# Patient Record
Sex: FEMALE | Race: WHITE | NOT HISPANIC OR LATINO | Employment: FULL TIME | URBAN - METROPOLITAN AREA
[De-identification: names, ages, dates, MRNs, and addresses within clinical notes are randomized per-mention and may not be internally consistent; named-entity substitution may affect disease eponyms.]

---

## 2017-11-29 ENCOUNTER — HOSPITAL ENCOUNTER (EMERGENCY)
Facility: CLINIC | Age: 37
Discharge: HOME | End: 2017-11-29
Attending: FAMILY MEDICINE | Admitting: FAMILY MEDICINE
Payer: COMMERCIAL

## 2017-11-29 VITALS
HEIGHT: 66 IN | HEART RATE: 70 BPM | BODY MASS INDEX: 41.55 KG/M2 | RESPIRATION RATE: 16 BRPM | WEIGHT: 258.56 LBS | TEMPERATURE: 97 F | DIASTOLIC BLOOD PRESSURE: 80 MMHG | SYSTOLIC BLOOD PRESSURE: 132 MMHG | OXYGEN SATURATION: 99 %

## 2017-11-29 DIAGNOSIS — G43.911 INTRACTABLE MIGRAINE WITH STATUS MIGRAINOSUS, UNSPECIFIED MIGRAINE TYPE: ICD-10-CM

## 2017-11-29 PROCEDURE — 96375 TX/PRO/DX INJ NEW DRUG ADDON: CPT | Performed by: FAMILY MEDICINE

## 2017-11-29 PROCEDURE — 96365 THER/PROPH/DIAG IV INF INIT: CPT | Performed by: FAMILY MEDICINE

## 2017-11-29 PROCEDURE — 96361 HYDRATE IV INFUSION ADD-ON: CPT | Performed by: FAMILY MEDICINE

## 2017-11-29 PROCEDURE — 99284 EMERGENCY DEPT VISIT MOD MDM: CPT | Mod: Z6 | Performed by: FAMILY MEDICINE

## 2017-11-29 PROCEDURE — 25000128 H RX IP 250 OP 636: Performed by: FAMILY MEDICINE

## 2017-11-29 PROCEDURE — 99284 EMERGENCY DEPT VISIT MOD MDM: CPT | Mod: 25 | Performed by: FAMILY MEDICINE

## 2017-11-29 RX ORDER — HALOPERIDOL 5 MG/ML
5 INJECTION INTRAMUSCULAR ONCE
Status: COMPLETED | OUTPATIENT
Start: 2017-11-29 | End: 2017-11-29

## 2017-11-29 RX ORDER — MAGNESIUM SULFATE 1 G/100ML
1 INJECTION INTRAVENOUS ONCE
Status: COMPLETED | OUTPATIENT
Start: 2017-11-29 | End: 2017-11-29

## 2017-11-29 RX ORDER — DIPHENHYDRAMINE HYDROCHLORIDE 50 MG/ML
25 INJECTION INTRAMUSCULAR; INTRAVENOUS ONCE
Status: COMPLETED | OUTPATIENT
Start: 2017-11-29 | End: 2017-11-29

## 2017-11-29 RX ORDER — DEXAMETHASONE SODIUM PHOSPHATE 10 MG/ML
10 INJECTION, SOLUTION INTRAMUSCULAR; INTRAVENOUS ONCE
Status: COMPLETED | OUTPATIENT
Start: 2017-11-29 | End: 2017-11-29

## 2017-11-29 RX ORDER — KETOROLAC TROMETHAMINE 30 MG/ML
30 INJECTION, SOLUTION INTRAMUSCULAR; INTRAVENOUS ONCE
Status: COMPLETED | OUTPATIENT
Start: 2017-11-29 | End: 2017-11-29

## 2017-11-29 RX ORDER — METOCLOPRAMIDE HYDROCHLORIDE 5 MG/ML
10 INJECTION INTRAMUSCULAR; INTRAVENOUS ONCE
Status: COMPLETED | OUTPATIENT
Start: 2017-11-29 | End: 2017-11-29

## 2017-11-29 RX ORDER — SODIUM CHLORIDE 9 MG/ML
1000 INJECTION, SOLUTION INTRAVENOUS CONTINUOUS
Status: DISCONTINUED | OUTPATIENT
Start: 2017-11-29 | End: 2017-11-29 | Stop reason: HOSPADM

## 2017-11-29 RX ADMIN — KETOROLAC TROMETHAMINE 30 MG: 30 INJECTION, SOLUTION INTRAMUSCULAR at 13:10

## 2017-11-29 RX ADMIN — DEXAMETHASONE SODIUM PHOSPHATE 10 MG: 10 INJECTION, SOLUTION INTRAMUSCULAR; INTRAVENOUS at 14:05

## 2017-11-29 RX ADMIN — HALOPERIDOL LACTATE 5 MG: 5 INJECTION, SOLUTION INTRAMUSCULAR at 14:07

## 2017-11-29 RX ADMIN — METOCLOPRAMIDE 10 MG: 5 INJECTION, SOLUTION INTRAMUSCULAR; INTRAVENOUS at 13:11

## 2017-11-29 RX ADMIN — SODIUM CHLORIDE 1000 ML: 9 INJECTION, SOLUTION INTRAVENOUS at 13:08

## 2017-11-29 RX ADMIN — MAGNESIUM SULFATE IN DEXTROSE 1 G: 10 INJECTION, SOLUTION INTRAVENOUS at 13:08

## 2017-11-29 RX ADMIN — DIPHENHYDRAMINE HYDROCHLORIDE 25 MG: 50 INJECTION, SOLUTION INTRAMUSCULAR; INTRAVENOUS at 13:11

## 2017-11-29 NOTE — LETTER
[facility]  Emergency Department  [address], [place] [address] [phone] fax      11/29/2017      Cheyenne Salas  [address] [address] [facility] [address]  [phone] (home)         TO WHOM IT MAY CONCERN:      Cheyenne was seen in the Emergency Department on 11/29/2017.    Please excuse from work due to severe migraine headache yesterday and today.    She may return, without restrictions, when improved.      Sincerely,        Kain Carter MD  Emergency Physician

## 2017-11-29 NOTE — ED AVS SNAPSHOT
[facility] Emergency Department    [address] [place] [address]    Phone:  [phone]    Fax:  [phone]                                       Cheyenne Salas   MRN: [id number]    Department:  [facility] Emergency Department   Date of Visit:  11/29/2017           After Visit Summary Signature Page     I have received my discharge instructions, and my questions have been answered. I have discussed any challenges I see with this plan with the nurse or doctor.    ..........................................................................................................................................  Patient/Patient Representative Signature      ..........................................................................................................................................  Patient Representative Print Name and Relationship to Patient    ..................................................               ................................................  Date                                            Time    ..........................................................................................................................................  Reviewed by Signature/Title    ...................................................              ..............................................  Date                                                            Time

## 2017-11-29 NOTE — ED AVS SNAPSHOT
[facility] Emergency Department    [address] [place] [address]    Phone:  [phone]    Fax:  [phone]                                       Cheyenne Salas   MRN: [id number]    Department:  [facility] Emergency Department   Date of Visit:  11/29/2017           Patient Information     Date Of Birth          1980        Your diagnoses for this visit were:     Intractable migraine with status migrainosus, unspecified migraine type        You were seen by Kain Campo MD.      Follow-up Information     Follow up with Your primary physician.    Why:  As needed        Discharge Instructions       Go home and rest.  Recheck in clinic if persistent problems.  Return to the ED if worse/concerns.  It was a pleasure visiting with you this afternoon.  I'm glad you are feeling better and hope you continue to improve.    Thank you for choosing [facility]. We appreciate the opportunity to meet your urgent medical needs. Please let us know if we could have done anything to make your stay more satisfying.    After discharge, please closely monitor for any new or worsening symptoms. Return to the Emergency Department if you develop any acute worsening signs or symptoms.    If you received an opiate pain medication or sedative during your visit, please do not drive for at least 8 hours.     If you had lab work, cultures or imaging studies done during your stay, the final results may still be pending. We will call you if your plan of care needs to change. However, if you are not improving as expected, please follow up with your primary care provider or clinic.     Start any prescription medications that were prescribed to you and take them as directed.     Please see additional handouts that may be pertinent to your condition.            24 Hour Appointment Hotline       To make an appointment at any [facility], call [phone] ([phone]). If you  don't have a family doctor or clinic, we will help you find one. [facility] clinics are conveniently located to serve the needs of you and your family.             Review of your medicines      Our records show that you are taking the medicines listed below. If these are incorrect, please call your family doctor or clinic.        Dose / Directions Last dose taken    ALLEGRA PO        Refills:  0        CRYSELLE-28 0.3-30 MG-MCG per tablet   Generic drug:  norgestrel-ethinyl estradiol        1 TABLET DAILY   Refills:  0        FLONASE 50 MCG/ACT spray   Generic drug:  fluticasone        1 squirt each nostril once a day   Refills:  0        MAXALT PO        Refills:  0        NAPROSYN PO        Refills:  0        OXYCODONE HCL PO        Refills:  0        PRILOSEC OTC PO        Refills:  0        PROZAC PO   Dose:  40 mg        Take 40 mg by mouth   Refills:  0        Q-10 CO-ENZYME PO        1 QD PO   Refills:  0                Orders Needing Specimen Collection     None      Pending Results     No orders found from 11/27/2017 to 11/30/2017.            Pending Culture Results     No orders found from 11/27/2017 to 11/30/2017.            Pending Results Instructions     If you had any lab results that were not finalized at the time of your Discharge, you can call the ED Lab Result RN at [phone]. You will be contacted by this team for any positive Lab results or changes in treatment. The nurses are available 7 days a week from 10A to 6:30P.  You can leave a message 24 hours per day and they will return your call.        Thank you for choosing [facility]       Thank you for choosing [facility] for your care. Our goal is always to provide you with excellent care. Hearing back from our patients is one way we can continue to improve our services. Please take a few minutes to complete the written survey that you may receive in the mail after you visit with us. Thank you!        [facility] Information     Friday lets you send  "messages to your doctor, view your test results, renew your prescriptions, schedule appointments and more. To sign up, go to [email or web address] . Click on \"Log in\" on the left side of the screen, which will take you to the Welcome page. Then click on \"Sign up Now\" on the right side of the page.     You will be asked to enter the access code listed below, as well as some personal information. Please follow the directions to create your username and password.     Your access code is: [email or web address]  Expires: 2018  2:38 PM     Your access code will  in 90 days. If you need help or a new code, please call your [facility] clinic or [phone].        Care EveryWhere ID     This is your Care EveryWhere ID. This could be used by other organizations to access your [facility] medical records  [id number]        Equal Access to Services     [facility] : Hadii michael Almeida, wapiter ball, qaybkevan kaalmada efrain, luís head . So [facility] [phone].    ATENCIÓN: Si habla español, tiene a koehler disposición servicios gratuitos de asistencia lingüística. Llame al [phone].    We comply with applicable federal civil rights laws and [place] laws. We do not discriminate on the basis of race, color, national origin, age, disability, sex, sexual orientation, or gender identity.            After Visit Summary       This is your record. Keep this with you and show to your community pharmacist(s) and doctor(s) at your next visit.                  "

## 2017-11-29 NOTE — ED NOTES
Pt presents with severe left sided migraine. Pt states typical however medications she has taken has not helped. Pt states she hasn't had to go to the ER for headache for ten years. Pt did take maxalt and naprosyn and oxycodone from shoulder surgery. NOTE /116. Pt has been on BP meds in the past however not currently.

## 2017-11-29 NOTE — DISCHARGE INSTRUCTIONS
Go home and rest.  Recheck in clinic if persistent problems.  Return to the ED if worse/concerns.  It was a pleasure visiting with you this afternoon.  I'm glad you are feeling better and hope you continue to improve.    Thank you for choosing [facility]. We appreciate the opportunity to meet your urgent medical needs. Please let us know if we could have done anything to make your stay more satisfying.    After discharge, please closely monitor for any new or worsening symptoms. Return to the Emergency Department if you develop any acute worsening signs or symptoms.    If you received an opiate pain medication or sedative during your visit, please do not drive for at least 8 hours.     If you had lab work, cultures or imaging studies done during your stay, the final results may still be pending. We will call you if your plan of care needs to change. However, if you are not improving as expected, please follow up with your primary care provider or clinic.     Start any prescription medications that were prescribed to you and take them as directed.     Please see additional handouts that may be pertinent to your condition.

## 2017-11-29 NOTE — ED PROVIDER NOTES
History     Chief Complaint   Patient presents with     Headache     Two days ago. Nausea.      HPI  Cheyenne Salas is a 37 year old female who presents to the ED today with a left-sided headache.  She has a history of migraines and is actually been able to control them fairly well and hasn't required an ED visit for over 10 years.  To be uses Maxalt and sometimes oxycodone which has been very effective in aborting her headaches.  This headache unfortunately was present when she woke up yesterday morning so she was behind.  He tried her medications anyway and maybe felt slightly better by the evening but then slept poorly and it worsened again today.  This is a typical headache for her other than he wasn't able to control it because it was present upon waking.  No focal weakness or numbness.  No fevers, chills, sweats.  She does have nausea but no vomiting.  Is sensitive to light.      Problem List:    There are no active problems to display for this patient.       Past Medical History:    Past Medical History:   Diagnosis Date     Anxiety      Migraines      Unspecified otitis media        Past Surgical History:    Past Surgical History:   Procedure Laterality Date     HC CREATE EARDRUM OPENING,GEN ANESTH  1987    P.E. Tubes       Family History:    No family history on file.    Social History:  Marital Status:   [2]  Social History   Substance Use Topics     Smoking status: Never Smoker     Smokeless tobacco: Never Used      Comment: Boy Friend Smokes     Alcohol use No        Medications:      Fexofenadine HCl (ALLEGRA PO)   Omeprazole Magnesium (PRILOSEC OTC PO)   Rizatriptan Benzoate (MAXALT PO)   FLUoxetine HCl (PROZAC PO)   Naproxen (NAPROSYN PO)   OXYCODONE HCL PO   Q-10 CO-ENZYME OR   FLONASE 50 MCG/ACT NA SUSP   CRYSELLE-28 0.3-30 MG-MCG OR TABS         Review of Systems   All other systems reviewed and are negative.      Physical Exam   BP: (!) 202/116  Pulse: 102  Temp: 97  F (36.1  C)  Resp:  "16  Height: 167.6 cm (5' 6\")  Weight: 117.3 kg (258 lb 9 oz)  SpO2: 99 %      Physical Exam   Constitutional: She is oriented to person, place, and time. She appears well-developed and well-nourished. No distress.   HENT:   Mouth/Throat: Oropharynx is clear and moist.   Eyes: EOM are normal. Pupils are equal, round, and reactive to light.   Neck: Neck supple.   Tender in the left occiput and down the paraspinous musculature   Cardiovascular: Normal rate and regular rhythm.    Pulmonary/Chest: Effort normal and breath sounds normal.   Abdominal: Soft. There is no tenderness.   Musculoskeletal: Normal range of motion.   Neurological: She is alert and oriented to person, place, and time. She has normal strength. No cranial nerve deficit or sensory deficit. Coordination normal. GCS eye subscore is 4. GCS verbal subscore is 5. GCS motor subscore is 6.   Skin: Skin is warm and dry. No rash noted.   Psychiatric: She has a normal mood and affect.       ED Course      1 L NS  Magnesium Sulfate 1 gm IV  Benadry 25mg IV  Reglan 10mg IV  Toradol 30mg IV    Very little relief.    Decadron 10 mg IV and Haldol 5 mg IV given for the headache.     She feels much better after the Decadron and Haldol area and states her headache is down to 4/10 and kidney disease decreased.  She thinks she can make it at home and plans on going to sleep.          ED Course     Procedures           Assessments & Plan (with Medical Decision Making)    (G43.911) Intractable migraine with status migrainosus, unspecified migraine type  Comment:   Plan: She will follow up in clinic with her primary physician.  Verbal and written discharge instructions given.            I have reviewed the nursing notes.    I have reviewed the findings, diagnosis, plan and need for follow up with the patient.       New Prescriptions    No medications on file       Final diagnoses:   Intractable migraine with status migrainosus, unspecified migraine type       11/29/2017 "   [facility] EMERGENCY DEPARTMENT     Kain Campo MD  11/29/17 8830

## 2022-01-12 ENCOUNTER — LAB REQUISITION (OUTPATIENT)
Dept: LAB | Facility: CLINIC | Age: 42
End: 2022-01-12

## 2022-01-12 LAB — HBV SURFACE AB SERPL IA-ACNC: 25.69 M[IU]/ML

## 2022-01-12 PROCEDURE — 86481 TB AG RESPONSE T-CELL SUSP: CPT | Performed by: INTERNAL MEDICINE

## 2022-01-12 PROCEDURE — 86706 HEP B SURFACE ANTIBODY: CPT | Performed by: INTERNAL MEDICINE

## 2022-01-13 LAB
QUANTIFERON MITOGEN: 10 IU/ML
QUANTIFERON NIL TUBE: 0.01 IU/ML
QUANTIFERON TB1 TUBE: 0.01 IU/ML
QUANTIFERON TB2 TUBE: 0.02

## 2022-01-14 LAB
GAMMA INTERFERON BACKGROUND BLD IA-ACNC: 0.01 IU/ML
M TB IFN-G BLD-IMP: NEGATIVE
M TB IFN-G CD4+ BCKGRND COR BLD-ACNC: 9.99 IU/ML
MITOGEN IGNF BCKGRD COR BLD-ACNC: 0 IU/ML
MITOGEN IGNF BCKGRD COR BLD-ACNC: 0.01 IU/ML

## 2022-03-03 ENCOUNTER — HOSPITAL ENCOUNTER (EMERGENCY)
Facility: CLINIC | Age: 42
Discharge: HOME | End: 2022-03-03
Attending: EMERGENCY MEDICINE | Admitting: EMERGENCY MEDICINE
Payer: COMMERCIAL

## 2022-03-03 VITALS
HEART RATE: 78 BPM | RESPIRATION RATE: 16 BRPM | SYSTOLIC BLOOD PRESSURE: 136 MMHG | OXYGEN SATURATION: 98 % | DIASTOLIC BLOOD PRESSURE: 70 MMHG | TEMPERATURE: 97 F

## 2022-03-03 DIAGNOSIS — G43.809 OTHER MIGRAINE WITHOUT STATUS MIGRAINOSUS, NOT INTRACTABLE: ICD-10-CM

## 2022-03-03 PROCEDURE — 99284 EMERGENCY DEPT VISIT MOD MDM: CPT | Mod: 25 | Performed by: EMERGENCY MEDICINE

## 2022-03-03 PROCEDURE — 96361 HYDRATE IV INFUSION ADD-ON: CPT | Performed by: EMERGENCY MEDICINE

## 2022-03-03 PROCEDURE — 258N000003 HC RX IP 258 OP 636: Performed by: EMERGENCY MEDICINE

## 2022-03-03 PROCEDURE — 250N000011 HC RX IP 250 OP 636: Performed by: EMERGENCY MEDICINE

## 2022-03-03 PROCEDURE — 99284 EMERGENCY DEPT VISIT MOD MDM: CPT | Performed by: EMERGENCY MEDICINE

## 2022-03-03 PROCEDURE — 96375 TX/PRO/DX INJ NEW DRUG ADDON: CPT | Performed by: EMERGENCY MEDICINE

## 2022-03-03 PROCEDURE — 96365 THER/PROPH/DIAG IV INF INIT: CPT | Performed by: EMERGENCY MEDICINE

## 2022-03-03 RX ORDER — DIPHENHYDRAMINE HYDROCHLORIDE 50 MG/ML
50 INJECTION INTRAMUSCULAR; INTRAVENOUS ONCE
Status: COMPLETED | OUTPATIENT
Start: 2022-03-03 | End: 2022-03-03

## 2022-03-03 RX ORDER — KETOROLAC TROMETHAMINE 30 MG/ML
30 INJECTION, SOLUTION INTRAMUSCULAR; INTRAVENOUS ONCE
Status: COMPLETED | OUTPATIENT
Start: 2022-03-03 | End: 2022-03-03

## 2022-03-03 RX ORDER — ONDANSETRON 2 MG/ML
4 INJECTION INTRAMUSCULAR; INTRAVENOUS ONCE
Status: COMPLETED | OUTPATIENT
Start: 2022-03-03 | End: 2022-03-03

## 2022-03-03 RX ORDER — MAGNESIUM CARB/ALUMINUM HYDROX 105-160MG
296 TABLET,CHEWABLE ORAL ONCE
Status: DISCONTINUED | OUTPATIENT
Start: 2022-03-03 | End: 2022-03-03

## 2022-03-03 RX ORDER — MAGNESIUM SULFATE 1 G/100ML
1 INJECTION INTRAVENOUS ONCE
Status: COMPLETED | OUTPATIENT
Start: 2022-03-03 | End: 2022-03-03

## 2022-03-03 RX ORDER — SODIUM CHLORIDE 9 MG/ML
INJECTION, SOLUTION INTRAVENOUS CONTINUOUS
Status: DISCONTINUED | OUTPATIENT
Start: 2022-03-03 | End: 2022-03-03 | Stop reason: HOSPADM

## 2022-03-03 RX ADMIN — ONDANSETRON 4 MG: 2 INJECTION INTRAMUSCULAR; INTRAVENOUS at 09:14

## 2022-03-03 RX ADMIN — DIPHENHYDRAMINE HYDROCHLORIDE 50 MG: 50 INJECTION, SOLUTION INTRAMUSCULAR; INTRAVENOUS at 09:17

## 2022-03-03 RX ADMIN — SODIUM CHLORIDE 1000 ML: 9 INJECTION, SOLUTION INTRAVENOUS at 09:13

## 2022-03-03 RX ADMIN — MAGNESIUM SULFATE HEPTAHYDRATE 1 G: 1 INJECTION, SOLUTION INTRAVENOUS at 09:50

## 2022-03-03 RX ADMIN — KETOROLAC TROMETHAMINE 30 MG: 30 INJECTION, SOLUTION INTRAMUSCULAR; INTRAVENOUS at 09:22

## 2022-03-03 NOTE — LETTER
March 3, 2022      To Whom It May Concern:      Cheyenne AVINA Danaycameron was seen in our Emergency Department today, 03/03/22.    Please excuse her from work for today.    Sincerely,        Grahamli Guerra MD

## 2022-03-03 NOTE — ED PROVIDER NOTES
History     Chief Complaint   Patient presents with     Headache     HPI  Cheyenne Barkley is a 41 year old female who presents with complaints of migraine headache that began Tuesday evening.  Patient admits that there may be increased stress in her life but denies recent illness.  No ear pain, runny nose or sore throat.  No change in taste or smell.  No chest pain, shortness of breath or cough.  No abdominal pain but has nausea.  No diarrhea.  No focal motor or sensory changes.  No problems with gait.  Patient has chronic migraines.  She takes oral meds and receives Botox.  Development extremities.  With her home treatments she can have up to 3-4 migraines per month.  No exposure to infectious illness.  No recent fall or injury.  Does admit she has tension component to her headaches.    Allergies:  Allergies   Allergen Reactions     Propranolol Anaphylaxis     Seasonal Allergies        Problem List:    There are no problems to display for this patient.       Past Medical History:    Past Medical History:   Diagnosis Date     Anxiety      Migraines      Unspecified otitis media        Past Surgical History:    Past Surgical History:   Procedure Laterality Date     ZZ CREATE EARDRUM OPENING,GEN ANESTH  1987    P.E. Tubes       Family History:    No family history on file.    Social History:  Marital Status:   [4]  Social History     Tobacco Use     Smoking status: Never Smoker     Smokeless tobacco: Never Used     Tobacco comment: Boy Friend Smokes   Substance Use Topics     Alcohol use: No     Drug use: No        Medications:    CRYSELLE-28 0.3-30 MG-MCG OR TABS  Fexofenadine HCl (ALLEGRA PO)  FLONASE 50 MCG/ACT NA SUSP  FLUoxetine HCl (PROZAC PO)  Naproxen (NAPROSYN PO)  Omeprazole Magnesium (PRILOSEC OTC PO)  OXYCODONE HCL PO  Q-10 CO-ENZYME OR  Rizatriptan Benzoate (MAXALT PO)          Review of Systems all other systems are reviewed and are negative.    Physical Exam   BP: (!) 149/93  Pulse:  98  Temp: 97  F (36.1  C)  Resp: 14  SpO2: 99 %      Physical Exam alert female does not look toxic or ill.  She has equal and reactive pupils.  Extraocular motions are intact.  Mild photophobia.  Ears show scleral tympanum but no infection.  Nasal mucosal swelling without drainage.  Orally no lesions speech is clear.  Neck is supple but does have tenderness of the strap and trapezius musculature.  No meningismus.  Neurologically grossly nonfocal.    ED Course                 Procedures              Critical Care time:  none               No results found for this or any previous visit (from the past 24 hour(s)).    Medications   0.9% sodium chloride BOLUS (1,000 mLs Intravenous New Bag 3/3/22 0913)     Followed by   sodium chloride 0.9% infusion (has no administration in time range)   ketorolac (TORADOL) injection 30 mg (30 mg Intravenous Given 3/3/22 0922)   diphenhydrAMINE (BENADRYL) injection 50 mg (50 mg Intravenous Given 3/3/22 0917)   ondansetron (ZOFRAN) injection 4 mg (4 mg Intravenous Given 3/3/22 0914)   magnesium sulfate 1 gm in 100mL D5W intermittent infusion (0 g Intravenous Stopped 3/3/22 1023)     IV is established fluid bolus and the above meds are provided.  On recheck at 10:50 AM patient has improvement and feels she can go home.  Assessments & Plan (with Medical Decision Making)   Cheyenne Barkley is a 41 year old female who presents with complaints of migraine headache that began Tuesday evening.  Patient admits that there may be increased stress in her life but denies recent illness.  No ear pain, runny nose or sore throat.  No change in taste or smell.  No chest pain, shortness of breath or cough.  No abdominal pain but has nausea.  No diarrhea.  No focal motor or sensory changes.  No problems with gait.  Patient has chronic migraines.  She takes oral meds and receives Botox.  Development extremities.  With her home treatments she can have up to 3-4 migraines per month.  No exposure to  infectious illness.  No recent fall or injury.  Does admit she has tension component to her headaches.  On presentation patient was afebrile vitally stable.  Her exam did show some nasal mucosal swelling with some clear discharge.  Neck was supple without meningismus but did have tenderness of the trapezius and strap muscles.  With the above meds patient had marked improvement in her symptoms and felt she could go home.  Information on migraine is provided reasons to return for reassessment discussed  I have reviewed the nursing notes.    I have reviewed the findings, diagnosis, plan and need for follow up with the patient.       New Prescriptions    No medications on file       Final diagnoses:   Other migraine without status migrainosus, not intractable       3/3/2022   [facility] EMERGENCY DEPT     Graham Guerra MD  03/03/22 0186

## 2024-05-21 ENCOUNTER — HOSPITAL ENCOUNTER (EMERGENCY)
Facility: CLINIC | Age: 44
Discharge: HOME | End: 2024-05-21
Attending: STUDENT IN AN ORGANIZED HEALTH CARE EDUCATION/TRAINING PROGRAM | Admitting: STUDENT IN AN ORGANIZED HEALTH CARE EDUCATION/TRAINING PROGRAM

## 2024-05-21 VITALS
WEIGHT: 293 LBS | RESPIRATION RATE: 20 BRPM | OXYGEN SATURATION: 96 % | HEIGHT: 66 IN | SYSTOLIC BLOOD PRESSURE: 133 MMHG | DIASTOLIC BLOOD PRESSURE: 71 MMHG | TEMPERATURE: 97.6 F | BODY MASS INDEX: 47.09 KG/M2 | HEART RATE: 89 BPM

## 2024-05-21 DIAGNOSIS — G43.909 MIGRAINE WITHOUT STATUS MIGRAINOSUS, NOT INTRACTABLE, UNSPECIFIED MIGRAINE TYPE: ICD-10-CM

## 2024-05-21 LAB
ANION GAP SERPL CALCULATED.3IONS-SCNC: 11 MMOL/L (ref 7–15)
BASOPHILS # BLD AUTO: 0 10E3/UL (ref 0–0.2)
BASOPHILS NFR BLD AUTO: 0 %
BUN SERPL-MCNC: 19.9 MG/DL (ref 6–20)
CALCIUM SERPL-MCNC: 9.2 MG/DL (ref 8.6–10)
CHLORIDE SERPL-SCNC: 100 MMOL/L (ref 98–107)
CREAT SERPL-MCNC: 0.99 MG/DL (ref 0.51–0.95)
DEPRECATED HCO3 PLAS-SCNC: 26 MMOL/L (ref 22–29)
EGFRCR SERPLBLD CKD-EPI 2021: 72 ML/MIN/1.73M2
EOSINOPHIL # BLD AUTO: 0.2 10E3/UL (ref 0–0.7)
EOSINOPHIL NFR BLD AUTO: 2 %
ERYTHROCYTE [DISTWIDTH] IN BLOOD BY AUTOMATED COUNT: 13.9 % (ref 10–15)
GLUCOSE SERPL-MCNC: 151 MG/DL (ref 70–99)
HCT VFR BLD AUTO: 43.4 % (ref 35–47)
HGB BLD-MCNC: 14.3 G/DL (ref 11.7–15.7)
IMM GRANULOCYTES # BLD: 0.1 10E3/UL
IMM GRANULOCYTES NFR BLD: 1 %
LYMPHOCYTES # BLD AUTO: 1.7 10E3/UL (ref 0.8–5.3)
LYMPHOCYTES NFR BLD AUTO: 21 %
MCH RBC QN AUTO: 28.4 PG (ref 26.5–33)
MCHC RBC AUTO-ENTMCNC: 32.9 G/DL (ref 31.5–36.5)
MCV RBC AUTO: 86 FL (ref 78–100)
MONOCYTES # BLD AUTO: 0.6 10E3/UL (ref 0–1.3)
MONOCYTES NFR BLD AUTO: 7 %
NEUTROPHILS # BLD AUTO: 5.6 10E3/UL (ref 1.6–8.3)
NEUTROPHILS NFR BLD AUTO: 70 %
NRBC # BLD AUTO: 0 10E3/UL
NRBC BLD AUTO-RTO: 0 /100
PLATELET # BLD AUTO: 277 10E3/UL (ref 150–450)
POTASSIUM SERPL-SCNC: 3.7 MMOL/L (ref 3.4–5.3)
RBC # BLD AUTO: 5.04 10E6/UL (ref 3.8–5.2)
SODIUM SERPL-SCNC: 137 MMOL/L (ref 135–145)
WBC # BLD AUTO: 8.1 10E3/UL (ref 4–11)

## 2024-05-21 PROCEDURE — 82374 ASSAY BLOOD CARBON DIOXIDE: CPT | Performed by: STUDENT IN AN ORGANIZED HEALTH CARE EDUCATION/TRAINING PROGRAM

## 2024-05-21 PROCEDURE — 99283 EMERGENCY DEPT VISIT LOW MDM: CPT | Performed by: STUDENT IN AN ORGANIZED HEALTH CARE EDUCATION/TRAINING PROGRAM

## 2024-05-21 PROCEDURE — 36415 COLL VENOUS BLD VENIPUNCTURE: CPT | Performed by: STUDENT IN AN ORGANIZED HEALTH CARE EDUCATION/TRAINING PROGRAM

## 2024-05-21 PROCEDURE — 99284 EMERGENCY DEPT VISIT MOD MDM: CPT | Mod: 25 | Performed by: STUDENT IN AN ORGANIZED HEALTH CARE EDUCATION/TRAINING PROGRAM

## 2024-05-21 PROCEDURE — 96374 THER/PROPH/DIAG INJ IV PUSH: CPT | Performed by: STUDENT IN AN ORGANIZED HEALTH CARE EDUCATION/TRAINING PROGRAM

## 2024-05-21 PROCEDURE — 258N000003 HC RX IP 258 OP 636: Performed by: STUDENT IN AN ORGANIZED HEALTH CARE EDUCATION/TRAINING PROGRAM

## 2024-05-21 PROCEDURE — 85041 AUTOMATED RBC COUNT: CPT | Performed by: STUDENT IN AN ORGANIZED HEALTH CARE EDUCATION/TRAINING PROGRAM

## 2024-05-21 PROCEDURE — 250N000011 HC RX IP 250 OP 636: Performed by: STUDENT IN AN ORGANIZED HEALTH CARE EDUCATION/TRAINING PROGRAM

## 2024-05-21 PROCEDURE — 82565 ASSAY OF CREATININE: CPT | Performed by: STUDENT IN AN ORGANIZED HEALTH CARE EDUCATION/TRAINING PROGRAM

## 2024-05-21 PROCEDURE — 96375 TX/PRO/DX INJ NEW DRUG ADDON: CPT | Performed by: STUDENT IN AN ORGANIZED HEALTH CARE EDUCATION/TRAINING PROGRAM

## 2024-05-21 PROCEDURE — 96361 HYDRATE IV INFUSION ADD-ON: CPT | Performed by: STUDENT IN AN ORGANIZED HEALTH CARE EDUCATION/TRAINING PROGRAM

## 2024-05-21 RX ORDER — DIPHENHYDRAMINE HYDROCHLORIDE 50 MG/ML
25 INJECTION INTRAMUSCULAR; INTRAVENOUS ONCE
Status: COMPLETED | OUTPATIENT
Start: 2024-05-21 | End: 2024-05-21

## 2024-05-21 RX ORDER — KETOROLAC TROMETHAMINE 15 MG/ML
15 INJECTION, SOLUTION INTRAMUSCULAR; INTRAVENOUS ONCE
Status: COMPLETED | OUTPATIENT
Start: 2024-05-21 | End: 2024-05-21

## 2024-05-21 RX ADMIN — KETOROLAC TROMETHAMINE 15 MG: 15 INJECTION, SOLUTION INTRAMUSCULAR; INTRAVENOUS at 07:56

## 2024-05-21 RX ADMIN — DIPHENHYDRAMINE HYDROCHLORIDE 25 MG: 50 INJECTION, SOLUTION INTRAMUSCULAR; INTRAVENOUS at 07:50

## 2024-05-21 RX ADMIN — PROCHLORPERAZINE EDISYLATE 10 MG: 5 INJECTION INTRAMUSCULAR; INTRAVENOUS at 07:59

## 2024-05-21 RX ADMIN — SODIUM CHLORIDE 1000 ML: 9 INJECTION, SOLUTION INTRAVENOUS at 07:42

## 2024-05-21 ASSESSMENT — ACTIVITIES OF DAILY LIVING (ADL)
ADLS_ACUITY_SCORE: 35
ADLS_ACUITY_SCORE: 35

## 2024-05-21 ASSESSMENT — COLUMBIA-SUICIDE SEVERITY RATING SCALE - C-SSRS
2. HAVE YOU ACTUALLY HAD ANY THOUGHTS OF KILLING YOURSELF IN THE PAST MONTH?: NO
6. HAVE YOU EVER DONE ANYTHING, STARTED TO DO ANYTHING, OR PREPARED TO DO ANYTHING TO END YOUR LIFE?: NO
1. IN THE PAST MONTH, HAVE YOU WISHED YOU WERE DEAD OR WISHED YOU COULD GO TO SLEEP AND NOT WAKE UP?: NO

## 2024-05-21 NOTE — ED PROVIDER NOTES
"  History     Chief Complaint   Patient presents with    Headache     HPI  Cheyenne Barkley is a 43 year old female with history of anxiety, migraines who presents for evaluation of a headache.  Patient has had increasing migraines over the last several months.  She follows regularly with a neurologist and recently changed preventative medications several weeks ago.  Then she developed one of her typical migraines 3 days ago and the left frontal pain has persisted ever since.  Her typical breakthrough medications did not provide any relief, so she now presents for evaluation.  She is experiencing her usual migraine aura with \"dots\" in all visual fields.  Patient denies any new or different characteristics to this headache or any recent head trauma/illness.  No other complaints today.  She denies any possibility of pregnancy.    Allergies:  Allergies   Allergen Reactions    Propranolol Anaphylaxis    Seasonal Allergies        Problem List:    There are no problems to display for this patient.       Past Medical History:    Past Medical History:   Diagnosis Date    Anxiety     Migraines     Unspecified otitis media        Past Surgical History:    Past Surgical History:   Procedure Laterality Date    ZZHC CREATE EARDRUM OPENING,GEN ANESTH  1987    P.E. Tubes       Family History:    No family history on file.    Social History:  Marital Status:   [4]  Social History     Tobacco Use    Smoking status: Never    Smokeless tobacco: Never    Tobacco comments:     Boy Friend Smokes   Substance Use Topics    Alcohol use: No    Drug use: No        Medications:    CRYSELLE-28 0.3-30 MG-MCG OR TABS  Fexofenadine HCl (ALLEGRA PO)  FLONASE 50 MCG/ACT NA SUSP  FLUoxetine HCl (PROZAC PO)  Naproxen (NAPROSYN PO)  Omeprazole Magnesium (PRILOSEC OTC PO)  OXYCODONE HCL PO  Q-10 CO-ENZYME OR  Rizatriptan Benzoate (MAXALT PO)      Review of Systems   All other systems reviewed and are negative.  See HPI.    Physical Exam   BP: " "(!) 139/95  Pulse: 108  Temp: 96.9  F (36.1  C)  Resp: 20  Height: 167.6 cm (5' 6\")  Weight: 147.9 kg (326 lb)  SpO2: 98 %    Physical Exam  Vitals and nursing note reviewed.   Constitutional:       General: She is not in acute distress.     Appearance: Normal appearance. She is not ill-appearing or diaphoretic.      Comments: Slightly uncomfortable, otherwise fully alert and answering questions appropriately.  Nontoxic.   HENT:      Head: Normocephalic and atraumatic.      Nose: Nose normal.      Mouth/Throat:      Mouth: Mucous membranes are moist.      Pharynx: Oropharynx is clear. No oropharyngeal exudate.   Eyes:      General: No scleral icterus.     Extraocular Movements: Extraocular movements intact.      Conjunctiva/sclera: Conjunctivae normal.      Pupils: Pupils are equal, round, and reactive to light.   Neck:      Comments: No meningismus.  Cardiovascular:      Rate and Rhythm: Normal rate and regular rhythm.      Pulses: Normal pulses.      Heart sounds: Normal heart sounds.   Pulmonary:      Effort: Pulmonary effort is normal. No respiratory distress.      Breath sounds: Normal breath sounds.   Abdominal:      General: Abdomen is flat.   Musculoskeletal:         General: Normal range of motion.      Cervical back: Normal range of motion and neck supple. No tenderness.   Skin:     General: Skin is warm.      Capillary Refill: Capillary refill takes less than 2 seconds.      Findings: No rash.   Neurological:      General: No focal deficit present.      Mental Status: She is alert and oriented to person, place, and time.      Cranial Nerves: No cranial nerve deficit.      Sensory: No sensory deficit.      Motor: No weakness.      Coordination: Coordination normal.      Gait: Gait normal.      Comments: Neurological exam is entirely nonfocal.  Cranial nerves are intact, she has no nystagmus or meningismus, strength and sensation are equal in all extremities, normal finger-nose-finger/gait.   Psychiatric:   "       Mood and Affect: Mood normal.         ED Course        Procedures              Results for orders placed or performed during the hospital encounter of 05/21/24 (from the past 24 hour(s))   Basic metabolic panel   Result Value Ref Range    Sodium 137 135 - 145 mmol/L    Potassium 3.7 3.4 - 5.3 mmol/L    Chloride 100 98 - 107 mmol/L    Carbon Dioxide (CO2) 26 22 - 29 mmol/L    Anion Gap 11 7 - 15 mmol/L    Urea Nitrogen 19.9 6.0 - 20.0 mg/dL    Creatinine 0.99 (H) 0.51 - 0.95 mg/dL    GFR Estimate 72 >60 mL/min/1.73m2    Calcium 9.2 8.6 - 10.0 mg/dL    Glucose 151 (H) 70 - 99 mg/dL   CBC with platelets differential    Narrative    The following orders were created for panel order CBC with platelets differential.  Procedure                               Abnormality         Status                     ---------                               -----------         ------                     CBC with platelets and d...[[id number]]                      Final result                 Please view results for these tests on the individual orders.   CBC with platelets and differential   Result Value Ref Range    WBC Count 8.1 4.0 - 11.0 10e3/uL    RBC Count 5.04 3.80 - 5.20 10e6/uL    Hemoglobin 14.3 11.7 - 15.7 g/dL    Hematocrit 43.4 35.0 - 47.0 %    MCV 86 78 - 100 fL    MCH 28.4 26.5 - 33.0 pg    MCHC 32.9 31.5 - 36.5 g/dL    RDW 13.9 10.0 - 15.0 %    Platelet Count 277 150 - 450 10e3/uL    % Neutrophils 70 %    % Lymphocytes 21 %    % Monocytes 7 %    % Eosinophils 2 %    % Basophils 0 %    % Immature Granulocytes 1 %    NRBCs per 100 WBC 0 <1 /100    Absolute Neutrophils 5.6 1.6 - 8.3 10e3/uL    Absolute Lymphocytes 1.7 0.8 - 5.3 10e3/uL    Absolute Monocytes 0.6 0.0 - 1.3 10e3/uL    Absolute Eosinophils 0.2 0.0 - 0.7 10e3/uL    Absolute Basophils 0.0 0.0 - 0.2 10e3/uL    Absolute Immature Granulocytes 0.1 <=0.4 10e3/uL    Absolute NRBCs 0.0 10e3/uL       Medications   sodium chloride 0.9% BOLUS 1,000 mL (0 mLs  Intravenous Stopped 5/21/24 0846)   prochlorperazine (COMPAZINE) injection 10 mg (10 mg Intravenous $Given 5/21/24 7229)   diphenhydrAMINE (BENADRYL) injection 25 mg (25 mg Intravenous $Given 5/21/24 4250)   ketorolac (TORADOL) injection 15 mg (15 mg Intravenous $Given 5/21/24 8746)       Assessments & Plan (with Medical Decision Making)     I have reviewed the nursing notes.    I have reviewed the findings, diagnosis, plan and need for follow up with the patient.  Medical Decision Making  Cheyenne Barkley is a 43 year old female with history of anxiety, migraines who presents for evaluation of a headache.  This is typical of her usual migraines but lasting longer.  Patient was slightly tachycardic but with otherwise normal vitals.  Exam is very reassuring.  There is no meningismus or nystagmus, neurological exam is entirely nonfocal.  Patient has had success with migraine cocktails in the past, will start with combination of fluids, Compazine/Benadryl, and Toradol.  She denied possibility of pregnancy.    Labs were unremarkable.  No signs of infection or electrolyte abnormalities.  Headache completely resolved with the above medications and she felt improved enough to go home.  Advised rest and plenty of fluids, return to existing migraine medications, and follow-up with her neurologist for further recommendations.  Patient agrees to return to the emergency department sooner for any new or acutely worsening symptoms.    Discharge Medication List as of 5/21/2024  8:51 AM        Final diagnoses:   Migraine without status migrainosus, not intractable, unspecified migraine type     5/21/2024   [facility] EMERGENCY DEPT       Bimal Aguilar MD  05/21/24 0914

## 2024-05-21 NOTE — Clinical Note
Cheyenne Barkley was seen and treated in our emergency department on 5/21/2024.  She may return to work on 05/23/2024.       If you have any questions or concerns, please don't hesitate to call.      Bimal Aguilar MD no history of blood product transfusion

## 2024-05-21 NOTE — ED TRIAGE NOTES
Patient reports migraine and nausea for 3 days.     Triage Assessment (Adult)       Row Name 05/21/24 0712          Triage Assessment    Airway WDL WDL        Respiratory WDL    Respiratory WDL WDL        Skin Circulation/Temperature WDL    Skin Circulation/Temperature WDL WDL        Cognitive/Neuro/Behavioral WDL    Cognitive/Neuro/Behavioral WDL WDL

## 2024-05-21 NOTE — DISCHARGE INSTRUCTIONS
I am glad you are feeling better after the medications given here in the emergency department!  For future reference, we gave you a combination of Compazine/Benadryl, Toradol, and IV fluids.    Follow-up with your neurologist for reevaluation and use your existing migraine medications as needed going forward.  Return to the emergency department sooner for any new or acutely worsening symptoms.

## 2025-08-07 ENCOUNTER — HOSPITAL ENCOUNTER (EMERGENCY)
Facility: CLINIC | Age: 45
Discharge: HOME | End: 2025-08-07
Attending: STUDENT IN AN ORGANIZED HEALTH CARE EDUCATION/TRAINING PROGRAM
Payer: COMMERCIAL

## 2025-08-07 VITALS
SYSTOLIC BLOOD PRESSURE: 122 MMHG | HEART RATE: 83 BPM | TEMPERATURE: 97.8 F | DIASTOLIC BLOOD PRESSURE: 78 MMHG | WEIGHT: 293 LBS | RESPIRATION RATE: 20 BRPM | OXYGEN SATURATION: 97 % | BODY MASS INDEX: 51.94 KG/M2

## 2025-08-07 DIAGNOSIS — G43.E01 CHRONIC MIGRAINE WITH AURA AND WITH STATUS MIGRAINOSUS, NOT INTRACTABLE: Primary | ICD-10-CM

## 2025-08-07 PROCEDURE — 250N000011 HC RX IP 250 OP 636: Performed by: STUDENT IN AN ORGANIZED HEALTH CARE EDUCATION/TRAINING PROGRAM

## 2025-08-07 PROCEDURE — 96365 THER/PROPH/DIAG IV INF INIT: CPT | Performed by: STUDENT IN AN ORGANIZED HEALTH CARE EDUCATION/TRAINING PROGRAM

## 2025-08-07 PROCEDURE — 258N000003 HC RX IP 258 OP 636: Performed by: STUDENT IN AN ORGANIZED HEALTH CARE EDUCATION/TRAINING PROGRAM

## 2025-08-07 PROCEDURE — 96375 TX/PRO/DX INJ NEW DRUG ADDON: CPT | Performed by: STUDENT IN AN ORGANIZED HEALTH CARE EDUCATION/TRAINING PROGRAM

## 2025-08-07 PROCEDURE — 99284 EMERGENCY DEPT VISIT MOD MDM: CPT | Mod: 25 | Performed by: STUDENT IN AN ORGANIZED HEALTH CARE EDUCATION/TRAINING PROGRAM

## 2025-08-07 PROCEDURE — 99284 EMERGENCY DEPT VISIT MOD MDM: CPT | Performed by: STUDENT IN AN ORGANIZED HEALTH CARE EDUCATION/TRAINING PROGRAM

## 2025-08-07 RX ORDER — KETOROLAC TROMETHAMINE 30 MG/ML
30 INJECTION, SOLUTION INTRAMUSCULAR; INTRAVENOUS ONCE
Status: COMPLETED | OUTPATIENT
Start: 2025-08-07 | End: 2025-08-07

## 2025-08-07 RX ORDER — DEXAMETHASONE SODIUM PHOSPHATE 10 MG/ML
10 INJECTION, SOLUTION INTRAMUSCULAR; INTRAVENOUS ONCE
Status: COMPLETED | OUTPATIENT
Start: 2025-08-07 | End: 2025-08-07

## 2025-08-07 RX ORDER — DIPHENHYDRAMINE HYDROCHLORIDE 50 MG/ML
25 INJECTION, SOLUTION INTRAMUSCULAR; INTRAVENOUS ONCE
Status: COMPLETED | OUTPATIENT
Start: 2025-08-07 | End: 2025-08-07

## 2025-08-07 RX ORDER — MAGNESIUM SULFATE 1 G/100ML
1 INJECTION INTRAVENOUS ONCE
Status: COMPLETED | OUTPATIENT
Start: 2025-08-07 | End: 2025-08-07

## 2025-08-07 RX ORDER — HYDROMORPHONE HYDROCHLORIDE 1 MG/ML
0.5 INJECTION, SOLUTION INTRAMUSCULAR; INTRAVENOUS; SUBCUTANEOUS ONCE
Refills: 0 | Status: DISCONTINUED | OUTPATIENT
Start: 2025-08-07 | End: 2025-08-07

## 2025-08-07 RX ADMIN — DEXAMETHASONE SODIUM PHOSPHATE 10 MG: 10 INJECTION, SOLUTION INTRAMUSCULAR; INTRAVENOUS at 20:27

## 2025-08-07 RX ADMIN — DIPHENHYDRAMINE HYDROCHLORIDE 25 MG: 50 INJECTION, SOLUTION INTRAMUSCULAR; INTRAVENOUS at 20:26

## 2025-08-07 RX ADMIN — PROCHLORPERAZINE EDISYLATE 10 MG: 5 INJECTION INTRAMUSCULAR; INTRAVENOUS at 20:31

## 2025-08-07 RX ADMIN — KETOROLAC TROMETHAMINE 30 MG: 30 INJECTION, SOLUTION INTRAMUSCULAR at 20:27

## 2025-08-07 RX ADMIN — MAGNESIUM SULFATE HEPTAHYDRATE 1 G: 1 INJECTION, SOLUTION INTRAVENOUS at 20:32

## 2025-08-07 RX ADMIN — SODIUM CHLORIDE 1000 ML: 0.9 INJECTION, SOLUTION INTRAVENOUS at 20:25

## 2025-08-07 ASSESSMENT — ENCOUNTER SYMPTOMS
ACTIVITY CHANGE: 0
ARTHRALGIAS: 0
DIZZINESS: 0
NAUSEA: 0
CHILLS: 0
ABDOMINAL PAIN: 0
SORE THROAT: 0
HEADACHES: 1
MYALGIAS: 0
COUGH: 0
DIARRHEA: 0
VOMITING: 0
EYE REDNESS: 0
RHINORRHEA: 0
APPETITE CHANGE: 0
SHORTNESS OF BREATH: 0
HEMATURIA: 0
FEVER: 0
DYSURIA: 0
NECK STIFFNESS: 0
FATIGUE: 0

## 2025-08-07 ASSESSMENT — ACTIVITIES OF DAILY LIVING (ADL)
ADLS_ACUITY_SCORE: 41

## 2025-08-07 ASSESSMENT — COLUMBIA-SUICIDE SEVERITY RATING SCALE - C-SSRS
2. HAVE YOU ACTUALLY HAD ANY THOUGHTS OF KILLING YOURSELF IN THE PAST MONTH?: NO
1. IN THE PAST MONTH, HAVE YOU WISHED YOU WERE DEAD OR WISHED YOU COULD GO TO SLEEP AND NOT WAKE UP?: NO
6. HAVE YOU EVER DONE ANYTHING, STARTED TO DO ANYTHING, OR PREPARED TO DO ANYTHING TO END YOUR LIFE?: NO